# Patient Record
Sex: FEMALE | Race: ASIAN | NOT HISPANIC OR LATINO | URBAN - METROPOLITAN AREA
[De-identification: names, ages, dates, MRNs, and addresses within clinical notes are randomized per-mention and may not be internally consistent; named-entity substitution may affect disease eponyms.]

---

## 2019-03-19 ENCOUNTER — EMERGENCY (EMERGENCY)
Facility: HOSPITAL | Age: 79
LOS: 0 days | Discharge: HOME | End: 2019-03-19
Attending: EMERGENCY MEDICINE | Admitting: EMERGENCY MEDICINE
Payer: MEDICARE

## 2019-03-19 VITALS
SYSTOLIC BLOOD PRESSURE: 177 MMHG | DIASTOLIC BLOOD PRESSURE: 81 MMHG | HEART RATE: 62 BPM | TEMPERATURE: 98 F | OXYGEN SATURATION: 100 % | RESPIRATION RATE: 20 BRPM

## 2019-03-19 VITALS
OXYGEN SATURATION: 100 % | SYSTOLIC BLOOD PRESSURE: 140 MMHG | HEART RATE: 64 BPM | RESPIRATION RATE: 20 BRPM | DIASTOLIC BLOOD PRESSURE: 65 MMHG

## 2019-03-19 DIAGNOSIS — R51 HEADACHE: ICD-10-CM

## 2019-03-19 DIAGNOSIS — E78.00 PURE HYPERCHOLESTEROLEMIA, UNSPECIFIED: ICD-10-CM

## 2019-03-19 DIAGNOSIS — R42 DIZZINESS AND GIDDINESS: ICD-10-CM

## 2019-03-19 DIAGNOSIS — R07.89 OTHER CHEST PAIN: ICD-10-CM

## 2019-03-19 DIAGNOSIS — I10 ESSENTIAL (PRIMARY) HYPERTENSION: ICD-10-CM

## 2019-03-19 LAB
ALBUMIN SERPL ELPH-MCNC: 4.4 G/DL — SIGNIFICANT CHANGE UP (ref 3.5–5.2)
ALP SERPL-CCNC: 66 U/L — SIGNIFICANT CHANGE UP (ref 30–115)
ALT FLD-CCNC: 17 U/L — SIGNIFICANT CHANGE UP (ref 0–41)
ANION GAP SERPL CALC-SCNC: 11 MMOL/L — SIGNIFICANT CHANGE UP (ref 7–14)
APPEARANCE UR: ABNORMAL
APTT BLD: 32.5 SEC — SIGNIFICANT CHANGE UP (ref 27–39.2)
AST SERPL-CCNC: 27 U/L — SIGNIFICANT CHANGE UP (ref 0–41)
BASOPHILS # BLD AUTO: 0.02 K/UL — SIGNIFICANT CHANGE UP (ref 0–0.2)
BASOPHILS NFR BLD AUTO: 0.4 % — SIGNIFICANT CHANGE UP (ref 0–1)
BILIRUB SERPL-MCNC: 0.6 MG/DL — SIGNIFICANT CHANGE UP (ref 0.2–1.2)
BILIRUB UR-MCNC: NEGATIVE — SIGNIFICANT CHANGE UP
BUN SERPL-MCNC: 14 MG/DL — SIGNIFICANT CHANGE UP (ref 10–20)
CALCIUM SERPL-MCNC: 9.5 MG/DL — SIGNIFICANT CHANGE UP (ref 8.5–10.1)
CHLORIDE SERPL-SCNC: 110 MMOL/L — SIGNIFICANT CHANGE UP (ref 98–110)
CO2 SERPL-SCNC: 25 MMOL/L — SIGNIFICANT CHANGE UP (ref 17–32)
COLOR SPEC: YELLOW — SIGNIFICANT CHANGE UP
CREAT SERPL-MCNC: 0.8 MG/DL — SIGNIFICANT CHANGE UP (ref 0.7–1.5)
DIFF PNL FLD: NEGATIVE — SIGNIFICANT CHANGE UP
EOSINOPHIL # BLD AUTO: 0.06 K/UL — SIGNIFICANT CHANGE UP (ref 0–0.7)
EOSINOPHIL NFR BLD AUTO: 1.2 % — SIGNIFICANT CHANGE UP (ref 0–8)
GLUCOSE SERPL-MCNC: 114 MG/DL — HIGH (ref 70–99)
GLUCOSE UR QL: NEGATIVE MG/DL — SIGNIFICANT CHANGE UP
HCT VFR BLD CALC: 34.6 % — LOW (ref 37–47)
HGB BLD-MCNC: 11.5 G/DL — LOW (ref 12–16)
IMM GRANULOCYTES NFR BLD AUTO: 0.4 % — HIGH (ref 0.1–0.3)
INR BLD: 0.91 RATIO — SIGNIFICANT CHANGE UP (ref 0.65–1.3)
KETONES UR-MCNC: NEGATIVE — SIGNIFICANT CHANGE UP
LACTATE SERPL-SCNC: 0.7 MMOL/L — SIGNIFICANT CHANGE UP (ref 0.5–2.2)
LEUKOCYTE ESTERASE UR-ACNC: ABNORMAL
LYMPHOCYTES # BLD AUTO: 1.3 K/UL — SIGNIFICANT CHANGE UP (ref 1.2–3.4)
LYMPHOCYTES # BLD AUTO: 25.8 % — SIGNIFICANT CHANGE UP (ref 20.5–51.1)
MCHC RBC-ENTMCNC: 30.8 PG — SIGNIFICANT CHANGE UP (ref 27–31)
MCHC RBC-ENTMCNC: 33.2 G/DL — SIGNIFICANT CHANGE UP (ref 32–37)
MCV RBC AUTO: 92.8 FL — SIGNIFICANT CHANGE UP (ref 81–99)
MONOCYTES # BLD AUTO: 0.31 K/UL — SIGNIFICANT CHANGE UP (ref 0.1–0.6)
MONOCYTES NFR BLD AUTO: 6.2 % — SIGNIFICANT CHANGE UP (ref 1.7–9.3)
NEUTROPHILS # BLD AUTO: 3.32 K/UL — SIGNIFICANT CHANGE UP (ref 1.4–6.5)
NEUTROPHILS NFR BLD AUTO: 66 % — SIGNIFICANT CHANGE UP (ref 42.2–75.2)
NITRITE UR-MCNC: NEGATIVE — SIGNIFICANT CHANGE UP
NRBC # BLD: 0 /100 WBCS — SIGNIFICANT CHANGE UP (ref 0–0)
NT-PROBNP SERPL-SCNC: 177 PG/ML — SIGNIFICANT CHANGE UP (ref 0–300)
PH UR: 7.5 — SIGNIFICANT CHANGE UP (ref 5–8)
PLATELET # BLD AUTO: 160 K/UL — SIGNIFICANT CHANGE UP (ref 130–400)
POTASSIUM SERPL-MCNC: 4.1 MMOL/L — SIGNIFICANT CHANGE UP (ref 3.5–5)
POTASSIUM SERPL-SCNC: 4.1 MMOL/L — SIGNIFICANT CHANGE UP (ref 3.5–5)
PROT SERPL-MCNC: 6.9 G/DL — SIGNIFICANT CHANGE UP (ref 6–8)
PROT UR-MCNC: NEGATIVE MG/DL — SIGNIFICANT CHANGE UP
PROTHROM AB SERPL-ACNC: 10.5 SEC — SIGNIFICANT CHANGE UP (ref 9.95–12.87)
RBC # BLD: 3.73 M/UL — LOW (ref 4.2–5.4)
RBC # FLD: 12.4 % — SIGNIFICANT CHANGE UP (ref 11.5–14.5)
SODIUM SERPL-SCNC: 146 MMOL/L — SIGNIFICANT CHANGE UP (ref 135–146)
SP GR SPEC: 1.01 — SIGNIFICANT CHANGE UP (ref 1.01–1.03)
TROPONIN T SERPL-MCNC: <0.01 NG/ML — SIGNIFICANT CHANGE UP
TYPE + AB SCN PNL BLD: SIGNIFICANT CHANGE UP
UROBILINOGEN FLD QL: 0.2 MG/DL — SIGNIFICANT CHANGE UP (ref 0.2–0.2)
WBC # BLD: 5.03 K/UL — SIGNIFICANT CHANGE UP (ref 4.8–10.8)
WBC # FLD AUTO: 5.03 K/UL — SIGNIFICANT CHANGE UP (ref 4.8–10.8)

## 2019-03-19 PROCEDURE — 93010 ELECTROCARDIOGRAM REPORT: CPT

## 2019-03-19 RX ORDER — TICAGRELOR 90 MG/1
1 TABLET ORAL
Qty: 0 | Refills: 0 | COMMUNITY

## 2019-03-19 RX ORDER — METOPROLOL TARTRATE 50 MG
1 TABLET ORAL
Qty: 0 | Refills: 0 | COMMUNITY

## 2019-03-19 RX ORDER — MAGNESIUM OXIDE 400 MG ORAL TABLET 241.3 MG
1 TABLET ORAL
Qty: 0 | Refills: 0 | COMMUNITY

## 2019-03-19 RX ORDER — FOLIC ACID 0.8 MG
1 TABLET ORAL
Qty: 0 | Refills: 0 | COMMUNITY

## 2019-03-19 RX ORDER — BACLOFEN 100 %
0 POWDER (GRAM) MISCELLANEOUS
Qty: 0 | Refills: 0 | COMMUNITY

## 2019-03-19 RX ORDER — ATORVASTATIN CALCIUM 80 MG/1
1 TABLET, FILM COATED ORAL
Qty: 0 | Refills: 0 | COMMUNITY

## 2019-03-19 RX ORDER — OMEPRAZOLE 10 MG/1
1 CAPSULE, DELAYED RELEASE ORAL
Qty: 0 | Refills: 0 | COMMUNITY

## 2019-03-19 RX ORDER — LOSARTAN POTASSIUM 100 MG/1
1 TABLET, FILM COATED ORAL
Qty: 0 | Refills: 0 | COMMUNITY

## 2019-03-19 NOTE — ED PROVIDER NOTE - CLINICAL SUMMARY MEDICAL DECISION MAKING FREE TEXT BOX
78yF p/w dizziness, headache and CP x days.  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  Pt now reporting improvement in her sx.  D/w neuro, planned to place pt in obs for neuro workup + CP evaluation, but pt and family unwilling to stay.  Had extensive discussion with pt and family and unable to convince them to stay.    The patient wishes to leave against medical advice.  I have discussed the risks, benefits and alternatives (including the possibility of worsening of disease, pain, permanent disability, and/or death) with the patient and his/her family (if available).  The patient voices understanding of these risks, benefits, and alternatives and still wishes to sign out against medical advice.  The patient is awake, alert, oriented x 3 and has demonstrated capacity to refuse/direct care.  I have advised the patient that they can and should return immediately should they develop any worse/different/additional symptoms, or if they change their mind and want to continue their care. Patient has full capacity and has verbalized understanding.  Given detailed returned precautions.

## 2019-03-19 NOTE — ED PROVIDER NOTE - PROGRESS NOTE DETAILS
Pt initially planned for obs given indetermine cerebellar stroke + CP, but pt now saying she does not want to stay because she has a crucial dr appointment tomorrow for disability paperwork and now that she feels better, she would rather keep that appointment. Had extensive discussion w/ pt and family at bedside, using family as translators, to explain that we are concerned about her brain and age indeterminate stroke and that her chest pain may represent a new cardiac issue.  Pt and family feel confident that nothing will worsen overnight and report that the stroke is known and quite old (years ago).  They agree to f/u with her PCP, accepted referrals to neuro/cardio and were informed that they can come back at any time.

## 2019-03-19 NOTE — ED ADULT NURSE NOTE - OBJECTIVE STATEMENT
Pt BIBA from [facility], c/o dizziness, headache, palpitation, unsteady gait, and neck pain. Denies any other symptoms. Pt stated pt fell 2 days ago. (-)LOC, denies head trauma.

## 2019-03-19 NOTE — ED PROVIDER NOTE - CARE PROVIDER_API CALL
Dudley Ballesteros)  Cardiovascular Disease; Internal Medicine; Interventional Cardiology  [address]  [place] [address]  Phone: [phone]  Fax: [phone]  Follow Up Time:     Joey Fernandez)  Neurology; Neuromuscular Medicine  [address], [address]  [place], [place] [id number]  Phone: [phone]  Fax: [phone]  Follow Up Time:

## 2019-03-19 NOTE — ED PROVIDER NOTE - CARE PROVIDERS DIRECT ADDRESSES
[email or web address]

## 2019-03-19 NOTE — ED PROVIDER NOTE - NSFOLLOWUPINSTRUCTIONS_ED_ALL_ED_FT
You were seen in the ED for dizziness and chest pain.  Workup showed an old stroke.  You were supposed to stay longer in the hospital to monitor your heart and make sure you are not at risk for a heart attack or heart damage or arrhythmia.  Please follow up with your cardiologist and neurologist (see names above for suggestions, you can see any of them or their partners) and return to the ED for any further concerns.    Chest Pain    Chest pain can be caused by many different conditions which may or may not be dangerous. Causes include heartburn, lung infections, heart attack, blood clot in lungs, skin infections, strain or damage to muscle, cartilage, or bones, etc. In addition to a history and physical examination, an electrocardiogram (ECG) or other lab tests may have been performed to determine the cause of your chest pain. Follow up with your primary care provider or with a cardiologist as instructed.     SEEK IMMEDIATE MEDICAL CARE IF YOU HAVE ANY OF THE FOLLOWING SYMPTOMS: worsening chest pain, coughing up blood, unexplained back/neck/jaw pain, severe abdominal pain, dizziness or lightheadedness, fainting, shortness of breath, sweaty or clammy skin, vomiting, or racing heart beat. These symptoms may represent a serious problem that is an emergency. Do not wait to see if the symptoms will go away. Get medical help right away. Call 911 and do not drive yourself to the hospital.     Dizziness    Dizziness can manifest as a feeling of unsteadiness or light-headedness. You may feel like you are about to faint. This condition can be caused by a number of things, including medicines, dehydration, or illness. Drink enough fluid to keep your urine clear or pale yellow. Do not drink alcohol and limit your caffeine intake. Avoid quick or sudden movements.  Rise slowly from chairs and steady yourself until you feel okay. In the morning, first sit up on the side of the bed.    SEEK IMMEDIATE MEDICAL CARE IF YOU HAVE ANY OF THE FOLLOWING SYMPTOMS: vomiting, changes in your vision or speech, weakness in your arms or legs, trouble speaking or swallowing, chest pain, abdominal pain, shortness of breath, sweating, bleeding, headache, neck pain, or fever.     Syncope    Syncope is when you temporarily lose consciousness, also called fainting or passing out. It is caused by a sudden decrease in blood flow to the brain. Even though most causes of syncope are not dangerous, syncope can possibly be a sign of a serious medical problem. Signs that you may be about to faint include feeling dizzy, lightheaded, nausea, visual changes, or cold/clammy skin. Do not drive, operate heavy machinery, or play sports until your health care provider says it is okay.    SEEK IMMEDIATE MEDICAL CARE IF YOU HAVE ANY OF THE FOLLOWING SYMPTOMS: severe headache, pain in your chest/abdomen/back, bleeding from your mouth or rectum, palpitations, shortness of breath, pain with breathing, seizure, confusion, or trouble walking.

## 2019-03-19 NOTE — ED PROVIDER NOTE - PHYSICAL EXAMINATION
CONSTITUTIONAL: Well-developed; well-nourished; in no acute distress.   SKIN: warm, dry  HEAD: Normocephalic; atraumatic. no signs of contusions or abrasions   EYES: PERRL, EOMI, no conjunctival erythema  ENT: No nasal discharge; airway clear.  NECK: Supple; non tender.  CARD: S1, S2 normal;  Regular rate and rhythm.   RESP: No wheezes, rales or rhonchi. good air movement bilaterally   ABD: soft non tender, non distended, no rebound or guarding  EXT: Normal ROM.    LYMPH: No acute cervical adenopathy.  NEURO: Alert, oriented, grossly unremarkable. 5/5 strength in all 4 extremities, equal sensation bilaterally

## 2019-03-19 NOTE — ED ADULT NURSE NOTE - EXPLANATION OF PATIENT'S REASON FOR LEAVING
pt has a doctors appointment in the morning and does not want to stay overnight. Translated by granddaughter Paul pt has a doctors appointment in the morning and does not want to stay overnight. Translated by granddaughter Paul. Pt and family educated if patient had symptoms of chest pain, sob, dizziness, severe headache, or any changes in health return to hospital or call 911. Pt and family verbalized an understanding.

## 2019-03-19 NOTE — ED PROVIDER NOTE - ATTENDING CONTRIBUTION TO CARE
78 F to ED with dizziness, - hx from  but pt is a poor historian but as per /medics/rn  pt was tx from care facility for ams and diziness - she had a fall 2d ago but no extrem injury.  No le weakness or fever and well appearing non toxic.    No trauma, no injury.    AVSS, exam as noted, CTAB, RRR, abdomen soft NTND, (+) bowel sounds

## 2019-03-19 NOTE — ED ADULT NURSE NOTE - NSIMPLEMENTINTERV_GEN_ALL_ED
Implemented All Fall with Harm Risk Interventions:  [place] to call system. Call bell, personal items and telephone within reach. Instruct patient to call for assistance. Room bathroom lighting operational. Non-slip footwear when patient is off stretcher. Physically safe environment: no spills, clutter or unnecessary equipment. Stretcher in lowest position, wheels locked, appropriate side rails in place. Provide visual cue, wrist band, yellow gown, etc. Monitor gait and stability. Monitor for mental status changes and reorient to person, place, and time. Review medications for side effects contributing to fall risk. Reinforce activity limits and safety measures with patient and family. Provide visual clues: red socks.

## 2019-03-21 LAB
CULTURE RESULTS: SIGNIFICANT CHANGE UP
CULTURE RESULTS: SIGNIFICANT CHANGE UP
SPECIMEN SOURCE: SIGNIFICANT CHANGE UP

## 2021-03-25 NOTE — ED ADULT NURSE NOTE - CAS EDN DISCHARGE INTERVENTIONS
Complete all medications as prescribed. Follow up with Dentist as soon as possible. Return to the Emergency Dept for any worsening pain, swelling, fever, difficulty swallowing/breathing.
IV discontinued, cath removed intact

## 2024-01-05 NOTE — ED ADULT NURSE NOTE - NSFALLRSKASSESSDT_ED_ALL_ED
Start antibiotics as directed  Use Tylenol and ibuprofen for pain  Follow-up with your doctor   19-Mar-2019 14:04 mother